# Patient Record
Sex: FEMALE | Race: WHITE | NOT HISPANIC OR LATINO | Employment: OTHER | URBAN - METROPOLITAN AREA
[De-identification: names, ages, dates, MRNs, and addresses within clinical notes are randomized per-mention and may not be internally consistent; named-entity substitution may affect disease eponyms.]

---

## 2019-01-25 ENCOUNTER — OFFICE VISIT (OUTPATIENT)
Dept: URGENT CARE | Facility: CLINIC | Age: 69
End: 2019-01-25
Payer: MEDICARE

## 2019-01-25 VITALS
BODY MASS INDEX: 31.83 KG/M2 | SYSTOLIC BLOOD PRESSURE: 151 MMHG | OXYGEN SATURATION: 97 % | HEART RATE: 84 BPM | TEMPERATURE: 100 F | HEIGHT: 62 IN | WEIGHT: 173 LBS | RESPIRATION RATE: 16 BRPM | DIASTOLIC BLOOD PRESSURE: 81 MMHG

## 2019-01-25 DIAGNOSIS — J02.9 SORE THROAT: ICD-10-CM

## 2019-01-25 DIAGNOSIS — J02.9 ACUTE PHARYNGITIS, UNSPECIFIED ETIOLOGY: Primary | ICD-10-CM

## 2019-01-25 LAB
CTP QC/QA: YES
FLUAV AG NPH QL: NEGATIVE
FLUBV AG NPH QL: NEGATIVE

## 2019-01-25 PROCEDURE — 87804 POCT INFLUENZA A/B: ICD-10-PCS | Mod: QW,S$GLB,, | Performed by: NURSE PRACTITIONER

## 2019-01-25 PROCEDURE — 87804 INFLUENZA ASSAY W/OPTIC: CPT | Mod: QW,S$GLB,, | Performed by: NURSE PRACTITIONER

## 2019-01-25 PROCEDURE — 99203 OFFICE O/P NEW LOW 30 MIN: CPT | Mod: S$GLB,,, | Performed by: NURSE PRACTITIONER

## 2019-01-25 PROCEDURE — 99203 PR OFFICE/OUTPT VISIT, NEW, LEVL III, 30-44 MIN: ICD-10-PCS | Mod: S$GLB,,, | Performed by: NURSE PRACTITIONER

## 2019-01-25 RX ORDER — AMOXICILLIN AND CLAVULANATE POTASSIUM 500; 125 MG/1; MG/1
1 TABLET, FILM COATED ORAL 2 TIMES DAILY
Qty: 20 TABLET | Refills: 0 | Status: SHIPPED | OUTPATIENT
Start: 2019-01-25 | End: 2019-02-04

## 2019-01-25 RX ORDER — PREDNISONE 20 MG/1
20 TABLET ORAL DAILY
Qty: 5 TABLET | Refills: 0 | Status: SHIPPED | OUTPATIENT
Start: 2019-01-25 | End: 2019-01-30

## 2019-01-25 NOTE — PROGRESS NOTES
"Subjective:       Patient ID: Adelaida Kemp is a 68 y.o. female.    Vitals:  height is 5' 2" (1.575 m) and weight is 78.5 kg (173 lb). Her oral temperature is 99.5 °F (37.5 °C). Her blood pressure is 151/81 (abnormal) and her pulse is 84. Her respiration is 16 and oxygen saturation is 97%.     Chief Complaint: Sore Throat    Sore Throat    This is a new problem. The current episode started yesterday. The problem has been unchanged. Neither side of throat is experiencing more pain than the other. Maximum temperature: Did not take temp. 99.5 F at triage. The pain is at a severity of 8/10. The pain is moderate. Associated symptoms include coughing. Pertinent negatives include no congestion, ear pain, shortness of breath, stridor or vomiting. She has tried nothing for the symptoms.       Constitution: Negative for chills, sweating and fatigue.   HENT: Positive for sore throat. Negative for ear pain, congestion, sinus pain, sinus pressure and voice change.    Neck: Negative for painful lymph nodes.   Eyes: Negative for eye redness.   Respiratory: Positive for cough. Negative for chest tightness, sputum production, bloody sputum, COPD, shortness of breath, stridor, wheezing and asthma.    Gastrointestinal: Negative for nausea and vomiting.   Musculoskeletal: Negative for muscle ache.   Skin: Negative for rash.   Allergic/Immunologic: Negative for seasonal allergies and asthma.   Hematologic/Lymphatic: Negative for swollen lymph nodes.       Objective:      Physical Exam   Constitutional: She is oriented to person, place, and time. She appears well-developed and well-nourished. She is cooperative.  Non-toxic appearance. She does not appear ill. No distress.   HENT:   Head: Normocephalic and atraumatic.   Right Ear: Hearing, external ear and ear canal normal.   Left Ear: Hearing, tympanic membrane, external ear and ear canal normal.   Nose: Nose normal. No mucosal edema, rhinorrhea or nasal deformity. No epistaxis. " Right sinus exhibits no maxillary sinus tenderness and no frontal sinus tenderness. Left sinus exhibits no maxillary sinus tenderness and no frontal sinus tenderness.   Mouth/Throat: Uvula is midline and mucous membranes are normal. No trismus in the jaw. Normal dentition. No uvula swelling. Posterior oropharyngeal edema and posterior oropharyngeal erythema present.   Eyes: Conjunctivae and lids are normal. No scleral icterus.   Sclera clear bilat   Neck: Trachea normal, full passive range of motion without pain and phonation normal. Neck supple.   Cardiovascular: Normal rate, regular rhythm, normal heart sounds, intact distal pulses and normal pulses.   Pulmonary/Chest: Effort normal and breath sounds normal. No respiratory distress.   Abdominal: Soft. Normal appearance and bowel sounds are normal. She exhibits no distension. There is no tenderness.   Musculoskeletal: Normal range of motion. She exhibits no edema or deformity.   Neurological: She is alert and oriented to person, place, and time. She exhibits normal muscle tone. Coordination normal.   Skin: Skin is warm, dry and intact. She is not diaphoretic. No pallor.   Psychiatric: She has a normal mood and affect. Her speech is normal and behavior is normal. Judgment and thought content normal. Cognition and memory are normal.   Nursing note and vitals reviewed.      Assessment:       1. Sore throat    2. Acute pharyngitis, unspecified etiology        Plan:         Sore throat  -     predniSONE (DELTASONE) 20 MG tablet; Take 1 tablet (20 mg total) by mouth once daily. for 5 days  Dispense: 5 tablet; Refill: 0  -     POCT Influenza A/B    Acute pharyngitis, unspecified etiology  -     amoxicillin-clavulanate 500-125mg (AUGMENTIN) 500-125 mg Tab; Take 1 tablet (500 mg total) by mouth 2 (two) times daily. for 10 days  Dispense: 20 tablet; Refill: 0  -     predniSONE (DELTASONE) 20 MG tablet; Take 1 tablet (20 mg total) by mouth once daily. for 5 days  Dispense: 5  tablet; Refill: 0  -     POCT Influenza A/B

## 2019-01-25 NOTE — PATIENT INSTRUCTIONS
1.  Take all medications as directed. If you have been prescribed antibiotics, make sure to complete them.   2.  Make sure to get plenty of rest. (This is the best way to help your immune system fight illness.)   3.  Keep yourself/patient well hydrated. For adults, it is recommended to drink at least 8-10 glasses of water daily.   4.  Perform warm, salt water gargles to help reduce inflammation and throat discomfort. Chloraseptic sprays and throat lozenges will also help with your throat pain.  5. Change your toothbrush 24 hours after starting antibiotics to prevent reinfection.  6.  For patients above 6 months of age who are not allergic to and are not on anticoagulants, you can alternate Tylenol and Motrin every 4-6 hours for fever above 100.4F and/or pain.  For patients less than 6 months of age, allergic to or intolerant to NSAIDS, have gastritis, gastric ulcers, or history of GI bleeds, are pregnant, or are on anticoagulant therapy, you can take Tylenol every 4 hours as needed for fever above 100.4F and/or pain.   7. You should schedule a follow-up appointment with your Primary Care Provider/Pediatrician for recheck in 2-3 days or as directed at this visit.   8.  If your condition fails to improve in a timely manner, you should receive another evaluation by your Primary Care Provider/Pediatrician to discuss your concerns or return to urgent care for a recheck.  If your condition worsens at any time, you should report immediately to your nearest Emergency Department for further evaluation. **You must understand that you have received Urgent Care treatment only and that you may be released before all of your medical problems are known or treated. You, the patient, are responsible to arrange for follow-up care as instructed.